# Patient Record
(demographics unavailable — no encounter records)

---

## 2024-11-19 NOTE — HISTORY OF PRESENT ILLNESS
[___ Days Post Op] : post op day #[unfilled] [Clean/Dry/Intact] : clean, dry and intact [Xray (Date:___)] : [unfilled] Xray -  [Doing Well] : is doing well [No Sign of Infection] : is showing no signs of infection [Adequate Pain Control] : has adequate pain control [Chills] : no chills [Fever] : no fever [Nausea] : no nausea [Vomiting] : no vomiting [Erythema] : not erythematous [Discharge] : absent of discharge [Swelling] : not swollen [Dehiscence] : not dehisced [de-identified] : SPO: L hip arthroscopy with labrum repair, femoroplasty, acetabuloplasty, capsule repair DOS: 11/06/2024 [de-identified] : ANUSHA is a 35 year male here today for 1st post operative visit. He denies fever or chills, redness around or near the incision site(s), numbness/tingling. He denies nausea/ vomiting and admits to their appetite since their surgery being back to normal. Normal bowel habits at this time. Patient has started physical therapy. Patient presents today with hip hinge brace and crutches. Patient since their surgery has utilized tylenol as their primary pain control with relief in their symptoms. They no longer require narcotics for pain control. Patient is starting new job on [employer] next week.  [de-identified] : Incision sites are clean dry and intact. No surrounding erythema. No drainage. Range of motion 0-90 with no pain. No pain on internal and external rotation or logrolling. He can perform a straight leg raise. Motor and sensation are intact distally. He does not have numbness over the pudendal area. The surgical incision site(s) was clean, dry and intact. Additional findings included an unremarkable neurological exam and peripheral vascular exam normal. [de-identified] : Surgical imaging was reviewed in great detail with the patient. The following radiographs were ordered and read by me during this patient's visit. I reviewed each radiograph in detail with the patient and discussed the findings as highlighted below. 2 views of the L hip were obtained today that show no fracture, dislocation. There is no degenerative change seen. There is no malalignment. No obvious osseous abnormality. Otherwise unremarkable.  [de-identified] : I had a discussion with the patient regarding the operative and postoperative course. The patient is doing well. He should continue with physical therapy. Patient will follow up in 4 wks for repeat clinical assessment. He is cleared to return to the gym for upper body next week but should refrain from lower body workouts.

## 2024-11-19 NOTE — HISTORY OF PRESENT ILLNESS
[___ Days Post Op] : post op day #[unfilled] [Clean/Dry/Intact] : clean, dry and intact [Xray (Date:___)] : [unfilled] Xray -  [Doing Well] : is doing well [No Sign of Infection] : is showing no signs of infection [Adequate Pain Control] : has adequate pain control [Chills] : no chills [Fever] : no fever [Nausea] : no nausea [Vomiting] : no vomiting [Erythema] : not erythematous [Discharge] : absent of discharge [Swelling] : not swollen [Dehiscence] : not dehisced [de-identified] : SPO: L hip arthroscopy with labrum repair, femoroplasty, acetabuloplasty, capsule repair DOS: 11/06/2024 [de-identified] : ANUSHA is a 35 year male here today for 1st post operative visit. He denies fever or chills, redness around or near the incision site(s), numbness/tingling. He denies nausea/ vomiting and admits to their appetite since their surgery being back to normal. Normal bowel habits at this time. Patient has started physical therapy. Patient presents today with hip hinge brace and crutches. Patient since their surgery has utilized tylenol as their primary pain control with relief in their symptoms. They no longer require narcotics for pain control. Patient is starting new job on [employer] next week.  [de-identified] : Incision sites are clean dry and intact. No surrounding erythema. No drainage. Range of motion 0-90 with no pain. No pain on internal and external rotation or logrolling. He can perform a straight leg raise. Motor and sensation are intact distally. He does not have numbness over the pudendal area. The surgical incision site(s) was clean, dry and intact. Additional findings included an unremarkable neurological exam and peripheral vascular exam normal. [de-identified] : Surgical imaging was reviewed in great detail with the patient. The following radiographs were ordered and read by me during this patient's visit. I reviewed each radiograph in detail with the patient and discussed the findings as highlighted below. 2 views of the L hip were obtained today that show no fracture, dislocation. There is no degenerative change seen. There is no malalignment. No obvious osseous abnormality. Otherwise unremarkable.  [de-identified] : I had a discussion with the patient regarding the operative and postoperative course. The patient is doing well. He should continue with physical therapy. Patient will follow up in 4 wks for repeat clinical assessment. He is cleared to return to the gym for upper body next week but should refrain from lower body workouts.

## 2024-11-19 NOTE — ADDENDUM
[FreeTextEntry1] : Documented by Jory Judd acting as a scribe for Dr. Ko and Albert Swan PA-C on 11/19/2024. All medical record entries made by the Scribe were at my, Dr. Ko, and Albert Swan's, direction and personally dictated by me on 11/19/2024. I have reviewed the chart and agree that the record accurately reflects my personal performance of the history, physical exam, procedure and imaging.

## 2025-01-21 NOTE — ADDENDUM
[FreeTextEntry1] : Documented by Jory Judd acting as a scribe for Dr. Ko and Albert Swan PA-C on 01/21/2025. All medical record entries made by the Scribe were at my, Dr. Ko, and Albert Swan's, direction and personally dictated by me on 01/21/2025. I have reviewed the chart and agree that the record accurately reflects my personal performance of the history, physical exam, procedure and imaging.

## 2025-01-21 NOTE — HISTORY OF PRESENT ILLNESS
[___ Weeks Post Op] : [unfilled] weeks post op [Clean/Dry/Intact] : clean, dry and intact [Doing Well] : is doing well [No Sign of Infection] : is showing no signs of infection [Adequate Pain Control] : has adequate pain control [Chills] : no chills [Fever] : no fever [Nausea] : no nausea [Vomiting] : no vomiting [Erythema] : not erythematous [Discharge] : absent of discharge [Swelling] : not swollen [Dehiscence] : not dehisced [de-identified] : SPO: L hip arthroscopy with labrum repair, femoroplasty, acetabuloplasty, capsule repair DOS: 11/06/2024 [de-identified] : ANUSHA is a 35 year male here today for 2nd post operative visit. He denies fever or chills, redness around or near the incision site(s), numbness/tingling. He denies nausea/ vomiting and admits to their appetite since their surgery being back to normal. Normal bowel habits at this time. Patient has been following with physical therapy until about two weeks ago when he began a home exercise program. Patient since their surgery has utilized tylenol as their primary pain control with relief in their symptoms. They no longer require narcotics for pain control. Started leg work outs as of 1/06/2025 and states he has felt increased inflammation and soreness likely due to this.  [de-identified] : Incision sites are clean dry and intact. No surrounding erythema. No drainage. Range of motion 0-90 with no pain. Tender over hip flexor. No pain on internal rotation. Mild pain with external rotation. No pain with logrolling. He can perform a straight leg raise. Motor and sensation are intact distally. He does not have numbness over the pudendal area. The surgical incision site(s) was clean, dry and intact. Additional findings included an unremarkable neurological exam and peripheral vascular exam normal. [de-identified] : No new imaging.  [de-identified] : Patient is doing well post-operatively. [de-identified] : I had a discussion with the patient regarding the operative and postoperative course. The patient is doing well. He should continue with home exercise program. I recommend he follow with PT. Patient was given prescription of formal physical therapy that he will perform 2x/wk for 6-8 wks. Patient will follow up in 3 months for repeat clinical assessment.

## 2025-04-22 NOTE — REASON FOR VISIT
[Follow-Up Visit] : a follow-up visit for [Aftercare Following Surgery] : aftercare following surgery [FreeTextEntry2] : SPO: L hip arthroscopy with labrum repair, femoroplasty, acetabuloplasty, capsule repair; DOS: 11/06/2024.
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
## 2025-04-22 NOTE — DISCUSSION/SUMMARY
[de-identified] : Patient is doing well post-operatively. Patient should continue working on gaining full ROM for his L hip. I recommend he continue with PT. Patient was given prescription of formal physical therapy that he will perform 2x/wk for 6-8 wks. I will also provide him with a home exercise program for his L shoulder. All questions were answered and the patient verbalized understanding. The patient is in agreement with this treatment plan. Patient will follow u as needed for repeat clinical assessment.

## 2025-04-22 NOTE — PHYSICAL EXAM
[de-identified] : General: Well appearing, no acute distress Neurologic: A&Ox3, No focal deficits Head: NCAT without abrasions, lacerations, or ecchymosis to head, face, or scalp Eyes: No scleral icterus, no gross abnormalities Respiratory: Equal chest wall expansion bilaterally, no accessory muscle use Lymphatic: No lymphadenopathy palpated Skin: Warm and dry Psychiatric: Normal mood and affect  Examination of the Left hip reveals no obvious deformity or leg length discrepancy. There is no swelling noted. The patient is nontender to palpation over the greater trochanter, groin, and IT band. The patients range of motion is to 100 degrees of hip flexion, 40 degrees of abduction, 20 degrees of adduction, 40 degrees of internal rotation and 45 degrees of external rotation. The patient has 4/5 strength to resisted hip flexion. 5/5 strength to abduction and adduction. The patient has a negative TOÑO and FADIR Test. Negative Suárez Test. Negative resisted SLR test at 30 degrees of hip flexion ([facility]). Negative Piriformis Test. No SI joint instability. There is no pain with logrolling. The calf and thigh are soft and nontender bilaterally. The patient is grossly neurovascularly intact distally.   Examination of the Left shoulder shows no obvious deformity, swelling or erythema. Mild tenderness to palpation over the anterior shoulder. No AC joint tenderness. The patient demonstrates active/passive ROM of Forward Flexion to 145 degrees, External Rotation to 40 degrees and Internal Rotation to a mid lumbar level. The patient has a positive Jordan and Neers test. No pain with cross body adduction, lift off testing, AC compression testing or Yergason testing. The patient has 4/5 strength to forward flexion with pronation, internal and external rotation. Compartments are soft and nontender. The patient has 2+ cap refill and sensation is intact in the hand.  [de-identified] : No new imaging.

## 2025-04-22 NOTE — REVIEW OF SYSTEMS
[Negative] : Heme/Lymph [FreeTextEntry9] : SPO: L hip arthroscopy with labrum repair, femoroplasty, acetabuloplasty, capsule repair; DOS: 11/06/2024.

## 2025-04-22 NOTE — ADDENDUM
[FreeTextEntry1] : Documented by Jory Judd acting as a scribe for Dr. Ko on 04/22/2025. All medical record entries made by the Scribe were at my, Dr. Ko's, direction and personally dictated by me on 04/22/2025. I have reviewed the chart and agree that the record accurately reflects my personal performance of the history, physical exam, procedure and imaging.

## 2025-04-22 NOTE — CONSULT LETTER
[Dear  ___] : Dear  [unfilled], [Consult Letter:] : I had the pleasure of evaluating your patient, [unfilled]. [Please see my note below.] : Please see my note below. [Sincerely,] : Sincerely, [FreeTextEntry3] : Dr. David Ko

## 2025-04-22 NOTE — PHYSICAL EXAM
[de-identified] : General: Well appearing, no acute distress Neurologic: A&Ox3, No focal deficits Head: NCAT without abrasions, lacerations, or ecchymosis to head, face, or scalp Eyes: No scleral icterus, no gross abnormalities Respiratory: Equal chest wall expansion bilaterally, no accessory muscle use Lymphatic: No lymphadenopathy palpated Skin: Warm and dry Psychiatric: Normal mood and affect  Examination of the Left hip reveals no obvious deformity or leg length discrepancy. There is no swelling noted. The patient is nontender to palpation over the greater trochanter, groin, and IT band. The patients range of motion is to 100 degrees of hip flexion, 40 degrees of abduction, 20 degrees of adduction, 40 degrees of internal rotation and 45 degrees of external rotation. The patient has 4/5 strength to resisted hip flexion. 5/5 strength to abduction and adduction. The patient has a negative TOÑO and FADIR Test. Negative Suárez Test. Negative resisted SLR test at 30 degrees of hip flexion ([facility]). Negative Piriformis Test. No SI joint instability. There is no pain with logrolling. The calf and thigh are soft and nontender bilaterally. The patient is grossly neurovascularly intact distally.   Examination of the Left shoulder shows no obvious deformity, swelling or erythema. Mild tenderness to palpation over the anterior shoulder. No AC joint tenderness. The patient demonstrates active/passive ROM of Forward Flexion to 145 degrees, External Rotation to 40 degrees and Internal Rotation to a mid lumbar level. The patient has a positive Jordan and Neers test. No pain with cross body adduction, lift off testing, AC compression testing or Yergason testing. The patient has 4/5 strength to forward flexion with pronation, internal and external rotation. Compartments are soft and nontender. The patient has 2+ cap refill and sensation is intact in the hand.  [de-identified] : No new imaging.

## 2025-04-22 NOTE — DISCUSSION/SUMMARY
[de-identified] : Patient is doing well post-operatively. Patient should continue working on gaining full ROM for his L hip. I recommend he continue with PT. Patient was given prescription of formal physical therapy that he will perform 2x/wk for 6-8 wks. I will also provide him with a home exercise program for his L shoulder. All questions were answered and the patient verbalized understanding. The patient is in agreement with this treatment plan. Patient will follow u as needed for repeat clinical assessment.